# Patient Record
Sex: FEMALE | Race: BLACK OR AFRICAN AMERICAN | URBAN - METROPOLITAN AREA
[De-identification: names, ages, dates, MRNs, and addresses within clinical notes are randomized per-mention and may not be internally consistent; named-entity substitution may affect disease eponyms.]

---

## 2022-11-30 ENCOUNTER — TELEPHONE (OUTPATIENT)
Dept: NEUROLOGY | Age: 47
End: 2022-11-30

## 2022-11-30 DIAGNOSIS — G43.709 CHRONIC MIGRAINE WITHOUT AURA, NOT INTRACTABLE, WITHOUT STATUS MIGRAINOSUS: Primary | ICD-10-CM

## 2022-11-30 NOTE — TELEPHONE ENCOUNTER
Morenita Haro 630 left vm that the pt needs a PA for her Ajovy. He said that they already started a cover my meds and the key is [id number]. He said could we please complete and send to the plan? Rx is in and ready for review and signature.

## 2022-12-12 RX ORDER — FREMANEZUMAB-VFRM 225 MG/1.5ML
225 INJECTION SUBCUTANEOUS
Qty: 1.5 ML | Refills: 11 | Status: CANCELLED | OUTPATIENT
Start: 2022-12-12

## 2022-12-13 ENCOUNTER — OFFICE VISIT (OUTPATIENT)
Dept: NEUROLOGY | Age: 47
End: 2022-12-13
Payer: MEDICAID

## 2022-12-13 VITALS
HEART RATE: 70 BPM | SYSTOLIC BLOOD PRESSURE: 122 MMHG | RESPIRATION RATE: 93 BRPM | DIASTOLIC BLOOD PRESSURE: 90 MMHG | WEIGHT: 190 LBS

## 2022-12-13 DIAGNOSIS — G43.709 CHRONIC MIGRAINE WITHOUT AURA WITHOUT STATUS MIGRAINOSUS, NOT INTRACTABLE: Primary | ICD-10-CM

## 2022-12-13 PROCEDURE — 99214 OFFICE O/P EST MOD 30 MIN: CPT | Performed by: PSYCHIATRY & NEUROLOGY

## 2022-12-13 RX ORDER — METOCLOPRAMIDE 5 MG/1
TABLET ORAL
COMMUNITY
Start: 2022-12-01

## 2022-12-13 RX ORDER — MONTELUKAST SODIUM 10 MG/1
TABLET ORAL
COMMUNITY
Start: 2021-10-12

## 2022-12-13 RX ORDER — FLUDROCORTISONE ACETATE 0.1 MG/1
TABLET ORAL
COMMUNITY
Start: 2021-10-12

## 2022-12-13 RX ORDER — CALCIUM CARBONATE/VITAMIN D3 600 MG-10
0.5 TABLET ORAL DAILY
COMMUNITY

## 2022-12-13 RX ORDER — FREMANEZUMAB-VFRM 225 MG/1.5ML
INJECTION SUBCUTANEOUS
Qty: 1 ADJUSTABLE DOSE PRE-FILLED PEN SYRINGE | Refills: 11 | Status: SHIPPED | OUTPATIENT
Start: 2022-12-13

## 2022-12-13 RX ORDER — CIPROFLOXACIN 500 MG/1
TABLET, FILM COATED ORAL
COMMUNITY
Start: 2022-10-04

## 2022-12-13 RX ORDER — MECLIZINE HCL 12.5 MG/1
12.5 TABLET ORAL 3 TIMES DAILY PRN
COMMUNITY
Start: 2021-08-12

## 2022-12-13 RX ORDER — ACETAMINOPHEN 500 MG
500 TABLET ORAL EVERY 8 HOURS PRN
COMMUNITY

## 2022-12-13 RX ORDER — VALACYCLOVIR HYDROCHLORIDE 500 MG/1
500 TABLET, FILM COATED ORAL DAILY
COMMUNITY
Start: 2016-12-01

## 2022-12-13 RX ORDER — CARISOPRODOL 350 MG/1
350 TABLET ORAL 3 TIMES DAILY PRN
COMMUNITY
Start: 2021-10-13

## 2022-12-13 RX ORDER — POTASSIUM CHLORIDE 1500 MG/1
TABLET, EXTENDED RELEASE ORAL
COMMUNITY
Start: 2022-11-25

## 2022-12-13 RX ORDER — DIAZEPAM 2 MG/1
2 TABLET ORAL 3 TIMES DAILY PRN
COMMUNITY
Start: 2021-09-09

## 2022-12-13 RX ORDER — HYDROCODONE BITARTRATE AND ACETAMINOPHEN 10; 325 MG/1; MG/1
1 TABLET ORAL EVERY 6 HOURS PRN
COMMUNITY
Start: 2021-09-29

## 2022-12-13 RX ORDER — ONDANSETRON 4 MG/1
4 TABLET, ORALLY DISINTEGRATING ORAL 3 TIMES DAILY PRN
COMMUNITY
Start: 2022-10-04

## 2022-12-13 RX ORDER — GABAPENTIN 800 MG/1
TABLET ORAL
COMMUNITY
Start: 2021-06-29

## 2022-12-13 RX ORDER — RIZATRIPTAN BENZOATE 10 MG/1
TABLET, ORALLY DISINTEGRATING ORAL
COMMUNITY
Start: 2022-12-06

## 2022-12-13 RX ORDER — MIDODRINE HYDROCHLORIDE 10 MG/1
TABLET ORAL
COMMUNITY
Start: 2022-11-16

## 2022-12-13 RX ORDER — MIRTAZAPINE 15 MG/1
TABLET, FILM COATED ORAL
COMMUNITY
Start: 2022-11-25

## 2022-12-13 RX ORDER — OMEPRAZOLE 40 MG/1
CAPSULE, DELAYED RELEASE ORAL
COMMUNITY
Start: 2021-09-27

## 2022-12-13 ASSESSMENT — ENCOUNTER SYMPTOMS
EYES NEGATIVE: 1
RESPIRATORY NEGATIVE: 1
GASTROINTESTINAL NEGATIVE: 1
ALLERGIC/IMMUNOLOGIC NEGATIVE: 1

## 2022-12-13 NOTE — PROGRESS NOTES
[address], [address], [address]  Phone: [phone] Fax [phone]  Dr. Janna Smith      12/13/2022  Ronaldo Block     Patient is referred by the following provider for consultation regarding as below:       I reviewed the available records and notes and have examined patient with the following findings:     Chief Complaint:  Chief Complaint   Patient presents with    Follow-up    Migraine          HPI: This is a right handed 41 y.o. female with her daughter. The patient appears to be quite upset that we were not willing to work her in emergently for a headache of which we do not work anyone in emergently for a headache. I expressed this to them. That they can contact the office there is a series of things we could try to use to help her. On last evaluation which was in May she was on Ajovy and she went from having 30 migraines a month down to having 2 migraines a month. She now tells me that she never did have to a month that she has been getting 7 to 8/month at the moment. And they are pretty significant. Maxalt 10 mg MLT's we prescribed for are not helping her. She there are bilateral temporal headaches nausea photophobia phonophobia no vomiting. The severe pressure and blurred vision comes with it. She is already failed the following medicines over-the-counter medicines Topamax Imitrex Tylenol 8 today blood pressure medicines beta-blockers amitriptyline Aimovig gabapentin Flexeril Soma hydrocodone Mobic Zofran Toradol Maxalt and Effexor. And of course Ajovy which is only working for 2 weeks. 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 She also tells me that she has vertigo that is being treated and [place] by an ENT ENT doctor who is diagnosed her with Ménière's disease.   Treats her with meclizine and Valium which works really well for her and she is comfortable with the way they are treating her there. IMAGING REVIEW:  I REVIEWED PERTINENT  IMAGES AND REPORTS WITH THE PATIENT PERSONALLY, DIRECTLY AND FULLY. Past Medical History:  No past medical history on file. Past Surgical History:  No past surgical history on file. Social History:  Social History     Socioeconomic History    Marital status: Unknown     Spouse name: Not on file    Number of children: Not on file    Years of education: Not on file    Highest education level: Not on file   Occupational History    Not on file   Tobacco Use    Smoking status: Never     Passive exposure: Never    Smokeless tobacco: Never   Substance and Sexual Activity    Alcohol use: Never    Drug use: Never    Sexual activity: Not on file   Other Topics Concern    Not on file   Social History Narrative    Not on file     Social Determinants of Health     Financial Resource Strain: Not on file   Food Insecurity: Not on file   Transportation Needs: Not on file   Physical Activity: Not on file   Stress: Not on file   Social Connections: Not on file   Intimate Partner Violence: Not on file   Housing Stability: Not on file       Family History:   No family history on file. Current Outpatient Medications on File Prior to Visit   Medication Sig Dispense Refill    acetaminophen (TYLENOL) 500 MG tablet Take 500 mg by mouth every 8 hours as needed      budesonide (RINOCORT AQUA) 32 MCG/ACT nasal spray 1 spray by Nasal route 2 times daily      calcium carb-cholecalciferol 600-10 MG-MCG TABS per tab Take 0.5 tablets by mouth daily      carisoprodol (SOMA) 350 MG tablet Take 350 mg by mouth 3 times daily as needed. ciprofloxacin (CIPRO) 500 MG tablet TAKE 1 TABLET (500 MG) BY MOUTH 2 (TWO) TIMES A DAY FOR 7 DAYS      cyanocobalamin 1000 MCG tablet 1,000 mcg daily      diazePAM (VALIUM) 2 MG tablet Take 2 mg by mouth 3 times daily as needed.       fludrocortisone (FLORINEF) 0.1 MG tablet TAKE 1 TABLET BY MOUTH EVERY DAY      gabapentin (NEURONTIN) 800 MG tablet TAKE 1 TABLET (800 MG) BY MOUTH 3 (THREE) TIMES A DAY      HYDROcodone-acetaminophen (NORCO)  MG per tablet Take 1 tablet by mouth every 6 hours as needed. meclizine (ANTIVERT) 12.5 MG tablet Take 12.5 mg by mouth 3 times daily as needed      metoclopramide (REGLAN) 5 MG tablet TAKE 1 TABLET BY MOUTH FOUR TIMES A DAY      midodrine (PROAMATINE) 10 MG tablet       montelukast (SINGULAIR) 10 MG tablet TAKE 1 TABLET BY MOUTH NIGHTLY      mirtazapine (REMERON) 15 MG tablet TAKE 1 TABLET BY MOUTH NIGHTLY. omeprazole (PRILOSEC) 40 MG delayed release capsule TAKE 1 CAPSULE BY MOUTH EVERY DAY      ondansetron (ZOFRAN-ODT) 4 MG disintegrating tablet Take 4 mg by mouth 3 times daily as needed      KLOR-CON M20 20 MEQ extended release tablet TAKE 1 TABLET BY MOUTH EVERY DAY      rizatriptan (MAXALT-MLT) 10 MG disintegrating tablet TAKE 1 TABLET BY MOUTH ONCE AS NEEDED FOR MIGRAINE FOR UP TO 1 DOSE.      valACYclovir (VALTREX) 500 MG tablet Take 500 mg by mouth daily       No current facility-administered medications on file prior to visit. Allergies   Allergen Reactions    Morphine Hives and Swelling    Tizanidine Hallucinations and Other (See Comments)     Other reaction(s): Hallucinations-Intolerance  Other reaction(s): Hallucinations-Intolerance      Prednisolone Rash     Patient states that, she has Avascular necrosis  Patient states that, she has Avascular necrosis  Patient states that, she has Avascular necrosis         Review of Systems:  Review of Systems   Constitutional: Negative. HENT: Negative. Eyes: Negative. Respiratory: Negative. Cardiovascular: Negative. Gastrointestinal: Negative. Endocrine: Negative. Genitourinary: Negative. Musculoskeletal: Negative. Skin: Negative. Allergic/Immunologic: Negative. Neurological:  Positive for headaches. Hematological: Negative. Psychiatric/Behavioral: Negative.       No flowsheet data found.  No flowsheet data found. Vitals:    12/13/22 0845   BP: (!) 122/90   Pulse: 70   Resp: (!) 93   Weight: 190 lb (86.2 kg)        Physical Exam  Constitutional:       Appearance: Normal appearance. HENT:      Head: Normocephalic and atraumatic. Eyes:      Extraocular Movements: Extraocular movements intact and EOM normal.      Pupils: Pupils are equal, round, and reactive to light. Cardiovascular:      Rate and Rhythm: Normal rate. Pulmonary:      Effort: Pulmonary effort is normal.   Abdominal:      Palpations: Abdomen is soft. Neurological:      Mental Status: She is alert and oriented to person, place, and time. Neurologic Exam     Mental Status   Oriented to person, place, and time. Attention: normal. Concentration: normal.   Level of consciousness: alert  Knowledge: good. Cranial Nerves     CN II   Visual fields full to confrontation. CN III, IV, VI   Pupils are equal, round, and reactive to light. Extraocular motions are normal.     CN VII   Facial expression full, symmetric. Motor Exam   Right arm tone: normal  Left arm tone: normal  Right leg tone: decreased  Left leg tone: decreased        Assessment   Assessment / Plan:    Caridad Caruso was seen today for follow-up and migraine. Diagnoses and all orders for this visit:    Chronic migraine without aura without status migrainosus, not intractable it was somewhat of an odd conversation. They initially came to me wanting to know why would not work anyone in for headaches. And I made it very clear we do not work patients in for migraines if they have a problem they can contact us at which time we will figure out over the phone what needs to be done. They got very upset wanted a new doctor and I suggested they contact the neurologist in [place] there is of course all the neurologist and [facility]. But none of this group will work patients in emergently for this headache.   She also went on about her vertigo that she is being treated in [place] by an ENT doctor for Ménière's disease. That she has meclizine and Valium for and it seems to work. So there is not much we would do in regards to that. In the 2006 [address] does not seem to help. The Ajovy she is on she was quite upset with because it only works for 2 weeks out of the month. When I saw her last time she was only get 2 headaches a month with it but now she is up to 7 or 8/month. I strongly recommended Botox she wanted nothing to do with it at this time. If she would like Botox I will be more than welcome to set it up for her. We did give her 2 samples of Ajovy she cannot have steroids due to avascular necrosis of her bilateral hips and shoulders per the patient. The Diagnosis and differential diagnostic considerations, and Rx Tx were reviewed with the patient at length. No orders of the defined types were placed in this encounter. I have spent greater than 50% of visit discussing and counseling of patient  for treatment and diagnostic plan review. Total time 30min    . Notes: Patient is to continue all medications as directed by prescribing physicians. Continuations on today's visit are made based on the patient's report of current medications.              Dr. Yasmin Batista  Consultation Neurology, Neurodiagnostics and Neurotherapeutics  Neuroelectrophysiology, EEG, EMG  [facility]  [address] [place] [address], [address] [facility]  Phone:  [phone]  Fax:   [phone]

## 2023-06-29 RX ORDER — FREMANEZUMAB-VFRM 225 MG/1.5ML
INJECTION SUBCUTANEOUS
Qty: 1 ADJUSTABLE DOSE PRE-FILLED PEN SYRINGE | Refills: 11 | Status: SHIPPED | OUTPATIENT
Start: 2023-06-29

## 2023-07-04 RX ORDER — FREMANEZUMAB-VFRM 225 MG/1.5ML
INJECTION SUBCUTANEOUS
Qty: 1 ADJUSTABLE DOSE PRE-FILLED PEN SYRINGE | Refills: 11 | Status: SHIPPED | OUTPATIENT
Start: 2023-07-04

## 2023-08-31 RX ORDER — RIZATRIPTAN BENZOATE 10 MG/1
TABLET, ORALLY DISINTEGRATING ORAL
Qty: 9 TABLET | Refills: 9 | Status: SHIPPED | OUTPATIENT
Start: 2023-08-31

## 2024-01-29 ENCOUNTER — TELEPHONE (OUTPATIENT)
Dept: NEUROLOGY | Age: 49
End: 2024-01-29

## 2024-02-15 ENCOUNTER — TELEPHONE (OUTPATIENT)
Dept: NEUROLOGY | Age: 49
End: 2024-02-15

## 2024-02-15 NOTE — TELEPHONE ENCOUNTER
Patient left voicemail stating she needs a refill on Ajovy. Advised Per her chart Dr. Padilla sent the rx to [facility] last year July with 11 refills so this should still be active. PA is good until 4/24 per chart. Called to advise pt.

## 2024-05-29 ENCOUNTER — TELEPHONE (OUTPATIENT)
Dept: NEUROLOGY | Age: 49
End: 2024-05-29

## 2024-07-22 RX ORDER — FREMANEZUMAB-VFRM 225 MG/1.5ML
INJECTION SUBCUTANEOUS
Qty: 1 ADJUSTABLE DOSE PRE-FILLED PEN SYRINGE | Refills: 6 | Status: SHIPPED | OUTPATIENT
Start: 2024-07-22

## 2024-07-30 ENCOUNTER — TELEPHONE (OUTPATIENT)
Dept: NEUROLOGY | Age: 49
End: 2024-07-30

## 2024-07-30 RX ORDER — FREMANEZUMAB-VFRM 225 MG/1.5ML
INJECTION SUBCUTANEOUS
Qty: 1 ADJUSTABLE DOSE PRE-FILLED PEN SYRINGE | Refills: 6 | Status: SHIPPED | OUTPATIENT
Start: 2024-07-30

## 2024-07-31 ENCOUNTER — TELEPHONE (OUTPATIENT)
Dept: NEUROLOGY | Age: 49
End: 2024-07-31

## 2024-07-31 NOTE — TELEPHONE ENCOUNTER
Approved for AJOVY Soln Auto-inj 225MG/1.5ML, quantity up to 1 each per 30 days, under the pharmacy benefit. The drug has been approved from 07/30/2024 to 07/30/2025

## 2024-08-02 ENCOUNTER — TELEPHONE (OUTPATIENT)
Dept: NEUROLOGY | Age: 49
End: 2024-08-02

## 2024-09-16 RX ORDER — RIZATRIPTAN BENZOATE 10 MG/1
TABLET, ORALLY DISINTEGRATING ORAL
Qty: 9 TABLET | Refills: 9 | Status: SHIPPED | OUTPATIENT
Start: 2024-09-16